# Patient Record
Sex: FEMALE | NOT HISPANIC OR LATINO | URBAN - METROPOLITAN AREA
[De-identification: names, ages, dates, MRNs, and addresses within clinical notes are randomized per-mention and may not be internally consistent; named-entity substitution may affect disease eponyms.]

---

## 2019-09-27 ENCOUNTER — IMPORTED ENCOUNTER (OUTPATIENT)
Dept: URBAN - METROPOLITAN AREA CLINIC 1 | Facility: CLINIC | Age: 54
End: 2019-09-27

## 2019-09-27 PROBLEM — H52.03: Noted: 2019-09-27

## 2019-09-27 PROBLEM — H52.13: Noted: 2019-09-27

## 2019-09-27 PROCEDURE — S0620 ROUTINE OPHTHALMOLOGICAL EXA: HCPCS

## 2019-09-27 NOTE — PATIENT DISCUSSION
1. Myopia: Rx was given for correction if indicated and requested. 2. Cataracts- Observe. 3.Narrow Angles OU. 4. Allergic Conjunctivitis-Begin Zaditor BID OU. Return for an appointment in 1 year 36 with Dr. Elvia Oliver.

## 2020-10-28 ENCOUNTER — IMPORTED ENCOUNTER (OUTPATIENT)
Dept: URBAN - METROPOLITAN AREA CLINIC 1 | Facility: CLINIC | Age: 55
End: 2020-10-28

## 2020-10-28 PROBLEM — H52.4: Noted: 2020-10-28

## 2020-10-28 PROBLEM — H52.13: Noted: 2020-10-28

## 2020-10-28 PROCEDURE — S0621 ROUTINE OPHTHALMOLOGICAL EXA: HCPCS

## 2020-10-28 NOTE — PATIENT DISCUSSION
1. Myopia: Rx was given for correction if indicated and requested. 2. Cataracts- Observe. 3.Narrow Angles OU. 4. Allergic Conjunctivitis-Begin Zaditor BID OU. Return for an appointment in 1 year 36 with Dr. Erik Preciado.

## 2021-12-03 ENCOUNTER — IMPORTED ENCOUNTER (OUTPATIENT)
Dept: URBAN - METROPOLITAN AREA CLINIC 1 | Facility: CLINIC | Age: 56
End: 2021-12-03

## 2021-12-03 PROBLEM — H52.4: Noted: 2021-12-03

## 2021-12-03 PROBLEM — H52.11: Noted: 2021-12-03

## 2021-12-03 PROBLEM — H52.222: Noted: 2021-12-03

## 2021-12-03 PROCEDURE — S0621 ROUTINE OPHTHALMOLOGICAL EXA: HCPCS

## 2021-12-03 NOTE — PATIENT DISCUSSION
1. Myopia OD with Astigmatism OS -- Rx was given for correction if indicated and requested. 2. Presbyopia3. Cataracts- Observe. 4.Narrow Angles OU. 5. Allergic Conjunctivitis- Cont. Zaditor BID OU. Return for an appointment in 1 year 36 with Dr. Richy Traore.

## 2022-04-02 ASSESSMENT — KERATOMETRY
OS_AXISANGLE2_DEGREES: 057
OD_AXISANGLE2_DEGREES: 091
OD_K2POWER_DIOPTERS: 45.50
OS_AXISANGLE_DEGREES: 147
OD_K1POWER_DIOPTERS: 45.25
OS_K2POWER_DIOPTERS: 45.75
OS_K1POWER_DIOPTERS: 44.50
OD_AXISANGLE_DEGREES: 001

## 2022-04-02 ASSESSMENT — TONOMETRY
OD_IOP_MMHG: 12
OD_IOP_MMHG: 12
OS_IOP_MMHG: 12
OD_IOP_MMHG: 13

## 2022-04-02 ASSESSMENT — VISUAL ACUITY
OD_CC: J1+
OD_CC: 20/20
OS_CC: 20/20
OS_CC: J1
OD_CC: J1
OS_CC: 20/20
OD_CC: J1+
OS_CC: 20/20
OS_CC: J1+
OD_CC: 20/20
OS_CC: J1+
OD_CC: 20/20

## 2024-02-09 ENCOUNTER — COMPREHENSIVE EXAM (OUTPATIENT)
Dept: URBAN - METROPOLITAN AREA CLINIC 1 | Facility: CLINIC | Age: 59
End: 2024-02-09

## 2024-02-09 DIAGNOSIS — H52.4: ICD-10-CM

## 2024-02-09 DIAGNOSIS — H52.03: ICD-10-CM

## 2024-02-09 DIAGNOSIS — Z01.00: ICD-10-CM

## 2024-02-09 DIAGNOSIS — H52.222: ICD-10-CM

## 2024-02-09 PROCEDURE — 92015 DETERMINE REFRACTIVE STATE: CPT

## 2024-02-09 PROCEDURE — 92014 COMPRE OPH EXAM EST PT 1/>: CPT

## 2024-02-09 ASSESSMENT — VISUAL ACUITY
OD_CC: 20/20-1
OS_CC: J1
OS_CC: 20/25
OD_CC: J2

## 2024-02-09 ASSESSMENT — TONOMETRY
OD_IOP_MMHG: 13
OS_IOP_MMHG: 12

## 2025-03-26 ENCOUNTER — COMPREHENSIVE EXAM (OUTPATIENT)
Age: 60
End: 2025-03-26

## 2025-03-26 DIAGNOSIS — Z01.00: ICD-10-CM

## 2025-03-26 DIAGNOSIS — H52.4: ICD-10-CM

## 2025-03-26 DIAGNOSIS — H52.03: ICD-10-CM

## 2025-03-26 DIAGNOSIS — H52.222: ICD-10-CM

## 2025-03-26 PROCEDURE — 92014 COMPRE OPH EXAM EST PT 1/>: CPT

## 2025-03-26 PROCEDURE — 92015 DETERMINE REFRACTIVE STATE: CPT
